# Patient Record
Sex: FEMALE | Race: OTHER | NOT HISPANIC OR LATINO
[De-identification: names, ages, dates, MRNs, and addresses within clinical notes are randomized per-mention and may not be internally consistent; named-entity substitution may affect disease eponyms.]

---

## 2021-06-24 ENCOUNTER — NON-APPOINTMENT (OUTPATIENT)
Age: 46
End: 2021-06-24

## 2021-06-24 PROBLEM — Z00.00 ENCOUNTER FOR PREVENTIVE HEALTH EXAMINATION: Status: ACTIVE | Noted: 2021-06-24

## 2021-06-25 ENCOUNTER — APPOINTMENT (OUTPATIENT)
Dept: COLORECTAL SURGERY | Facility: CLINIC | Age: 46
End: 2021-06-25
Payer: MEDICAID

## 2021-06-25 ENCOUNTER — TRANSCRIPTION ENCOUNTER (OUTPATIENT)
Age: 46
End: 2021-06-25

## 2021-06-25 ENCOUNTER — NON-APPOINTMENT (OUTPATIENT)
Age: 46
End: 2021-06-25

## 2021-06-25 ENCOUNTER — OUTPATIENT (OUTPATIENT)
Dept: OUTPATIENT SERVICES | Facility: HOSPITAL | Age: 46
LOS: 1 days | End: 2021-06-25

## 2021-06-25 VITALS
HEIGHT: 63 IN | DIASTOLIC BLOOD PRESSURE: 85 MMHG | WEIGHT: 113 LBS | BODY MASS INDEX: 20.02 KG/M2 | HEART RATE: 88 BPM | SYSTOLIC BLOOD PRESSURE: 124 MMHG | TEMPERATURE: 98.2 F

## 2021-06-25 DIAGNOSIS — K63.89 OTHER SPECIFIED DISEASES OF INTESTINE: ICD-10-CM

## 2021-06-25 DIAGNOSIS — Z83.2 FAMILY HISTORY OF DISEASES OF THE BLOOD AND BLOOD-FORMING ORGANS AND CERTAIN DISORDERS INVOLVING THE IMMUNE MECHANISM: ICD-10-CM

## 2021-06-25 DIAGNOSIS — Z01.818 ENCOUNTER FOR OTHER PREPROCEDURAL EXAMINATION: ICD-10-CM

## 2021-06-25 DIAGNOSIS — Z82.49 FAMILY HISTORY OF ISCHEMIC HEART DISEASE AND OTHER DISEASES OF THE CIRCULATORY SYSTEM: ICD-10-CM

## 2021-06-25 DIAGNOSIS — Z78.9 OTHER SPECIFIED HEALTH STATUS: ICD-10-CM

## 2021-06-25 PROCEDURE — 99205 OFFICE O/P NEW HI 60 MIN: CPT

## 2021-06-25 NOTE — ASSESSMENT
[FreeTextEntry1] : I had extensive discussion with the patient (60  minutes) regarding the diagnosis and treatment options. I recommended that she consider proceeding with a robotic sigmoid colectomy.\par \par The associated risks, benefits, alternatives of the procedure have been outlined discussed and reviewed with the patient's family. These risks including but not limited to bleeding, infection, anastomotic leak, need for secondary surgery, need for ileostomy or colostomy creation, change in bowel habits, DVT, PE as well as the risk of heart and lung complications infection and death were detailed. The patient understands these risks and consents the planned procedure. Appropriate  literature regarding surgery and post operative treatment/complications and enhanced recovery pathway has been detailed and reviewed. Consent was obtained. All questions were answered.\par  [Patient Optimized for Surgery] : Patient optimized for surgery [No Further Testing Recommended] : no further testing recommended

## 2021-06-25 NOTE — HISTORY OF PRESENT ILLNESS
[No Pertinent Cardiac History] : no history of aortic stenosis, atrial fibrillation, coronary artery disease, recent myocardial infarction, or implantable device/pacemaker [No Pertinent Pulmonary History] : no history of asthma, COPD, sleep apnea, or smoking [No Adverse Anesthesia Reaction] : no adverse anesthesia reaction in self or family member [(Patient denies any chest pain, claudication, dyspnea on exertion, orthopnea, palpitations or syncope)] : Patient denies any chest pain, claudication, dyspnea on exertion, orthopnea, palpitations or syncope [Good (7-10 METs)] : Good (7-10 METs) [FreeTextEntry2] : 7/30/21 [FreeTextEntry3] : Yeyo Lundberg [FreeTextEntry1] : 47 y/o F presents for evaluation of newly diagnosed colon cancer, referred by Dr. Booker\par \par Reports "sensitive stomach" and BRBPR with the BM for several years. Approximately one year BRBPR became more frequent occurring with a majority of BM's streaked on top and in the bowl. PCP evaluated patient and recommended dietary changes and prep H ointment which provided some improvement in symptoms. When symptoms did not resolve, referred for colonoscopy \par \par Colonoscopy completed 6/15/2021 for rectal bleeding\par - frond-like/villous, fungating, infiltrative and ulcerated large mass found in the sigmoid colon. Mass was partially circumferential, measuring 5 cm in length. Biopsied and polyp resection performed but incomplete. Area tattooed\par - non-bleeding internal hemorrhoids\par \par Pathology\par A.) colon, sigmoid, mass, polyp: moderately invasive adenocarcinoma\par \par CT A/P completed 6/23/21 at \par - circumferential wall thickening of the mid sigmoid colon noted, which may represent neoplasm vs other etiology\par - multiple fibroids identified in the uterus \par \par Reports occasional stomach cramping, has been improved with cutting out dairy\par Started on Low-FODMAP diet\par Denies rectal pain but reports occasional rectal discomfort\par Denies N/V, change in appetite or weight\par BH:TID\par Denies constipation, diarrhea or straining\par No use of stool softeners, fiber supplements or laxatives\par Reports adequate dietary fiber intake. Currently drinking 8 cups of water daily \par FMH of possible GI issues (possible IBS) in mother. Denies FMH of colorectal cancer

## 2021-06-25 NOTE — HISTORY OF PRESENT ILLNESS
[No Pertinent Cardiac History] : no history of aortic stenosis, atrial fibrillation, coronary artery disease, recent myocardial infarction, or implantable device/pacemaker [No Pertinent Pulmonary History] : no history of asthma, COPD, sleep apnea, or smoking [No Adverse Anesthesia Reaction] : no adverse anesthesia reaction in self or family member [(Patient denies any chest pain, claudication, dyspnea on exertion, orthopnea, palpitations or syncope)] : Patient denies any chest pain, claudication, dyspnea on exertion, orthopnea, palpitations or syncope [Good (7-10 METs)] : Good (7-10 METs) [FreeTextEntry2] : 7/30/21 [FreeTextEntry3] : Yeyo Lundberg [FreeTextEntry1] : 45 y/o F presents for evaluation of newly diagnosed colon cancer, referred by Dr. Booker\par \par Reports "sensitive stomach" and BRBPR with the BM for several years. Approximately one year BRBPR became more frequent occurring with a majority of BM's streaked on top and in the bowl. PCP evaluated patient and recommended dietary changes and prep H ointment which provided some improvement in symptoms. When symptoms did not resolve, referred for colonoscopy \par \par Colonoscopy completed 6/15/2021 for rectal bleeding\par - frond-like/villous, fungating, infiltrative and ulcerated large mass found in the sigmoid colon. Mass was partially circumferential, measuring 5 cm in length. Biopsied and polyp resection performed but incomplete. Area tattooed\par - non-bleeding internal hemorrhoids\par \par Pathology\par A.) colon, sigmoid, mass, polyp: moderately invasive adenocarcinoma\par \par CT A/P completed 6/23/21 at \par - circumferential wall thickening of the mid sigmoid colon noted, which may represent neoplasm vs other etiology\par - multiple fibroids identified in the uterus \par \par Reports occasional stomach cramping, has been improved with cutting out dairy\par Started on Low-FODMAP diet\par Denies rectal pain but reports occasional rectal discomfort\par Denies N/V, change in appetite or weight\par BH:TID\par Denies constipation, diarrhea or straining\par No use of stool softeners, fiber supplements or laxatives\par Reports adequate dietary fiber intake. Currently drinking 8 cups of water daily \par FMH of possible GI issues (possible IBS) in mother. Denies FMH of colorectal cancer

## 2021-06-25 NOTE — PHYSICAL EXAM
[Abdomen Masses] : No abdominal masses [Abdomen Tenderness] : ~T No ~M abdominal tenderness [No HSM] : no hepatosplenomegaly [JVD] : no jugular venous distention  [Normal Breath Sounds] : Normal breath sounds [Normal Heart Sounds] : normal heart sounds [No Rash or Lesion] : No rash or lesion [Alert] : alert [Calm] : calm [No Acute Distress] : no acute distress [Well Nourished] : well nourished [Well Developed] : well developed [Well-Appearing] : well-appearing [Normal Sclera/Conjunctiva] : normal sclera/conjunctiva [Normal Outer Ear/Nose] : the outer ears and nose were normal in appearance [No JVD] : no jugular venous distention [No Respiratory Distress] : no respiratory distress  [No Accessory Muscle Use] : no accessory muscle use [Clear to Auscultation] : lungs were clear to auscultation bilaterally [Normal Rate] : normal rate  [Regular Rhythm] : with a regular rhythm [Normal S1, S2] : normal S1 and S2 [No Murmur] : no murmur heard [Soft] : abdomen soft [Non Tender] : non-tender [Non-distended] : non-distended [Normal Posterior Cervical Nodes] : no posterior cervical lymphadenopathy [Normal Anterior Cervical Nodes] : no anterior cervical lymphadenopathy [No Joint Swelling] : no joint swelling [Grossly Normal Strength/Tone] : grossly normal strength/tone [No Rash] : no rash [Coordination Grossly Intact] : coordination grossly intact [No Focal Deficits] : no focal deficits [Normal Gait] : normal gait [Normal Affect] : the affect was normal [Normal Insight/Judgement] : insight and judgment were intact

## 2021-06-28 ENCOUNTER — RESULT REVIEW (OUTPATIENT)
Age: 46
End: 2021-06-28

## 2021-06-28 ENCOUNTER — OUTPATIENT (OUTPATIENT)
Dept: OUTPATIENT SERVICES | Facility: HOSPITAL | Age: 46
LOS: 1 days | End: 2021-06-28
Payer: COMMERCIAL

## 2021-06-28 ENCOUNTER — APPOINTMENT (OUTPATIENT)
Dept: CT IMAGING | Facility: CLINIC | Age: 46
End: 2021-06-28
Payer: MEDICAID

## 2021-06-28 ENCOUNTER — OUTPATIENT (OUTPATIENT)
Dept: OUTPATIENT SERVICES | Facility: HOSPITAL | Age: 46
LOS: 1 days | End: 2021-06-28

## 2021-06-28 DIAGNOSIS — K63.89 OTHER SPECIFIED DISEASES OF INTESTINE: ICD-10-CM

## 2021-06-28 LAB
ALBUMIN SERPL ELPH-MCNC: 4.2 G/DL
ALP BLD-CCNC: 57 U/L
ALT SERPL-CCNC: 10 U/L
ANION GAP SERPL CALC-SCNC: 13 MMOL/L
APTT BLD: 28.2 SEC
AST SERPL-CCNC: 15 U/L
BASOPHILS # BLD AUTO: 0.08 K/UL
BASOPHILS NFR BLD AUTO: 1.6 %
BILIRUB SERPL-MCNC: 0.6 MG/DL
BUN SERPL-MCNC: 8 MG/DL
CALCIUM SERPL-MCNC: 9.8 MG/DL
CEA SERPL-MCNC: 1.2 NG/ML
CHLORIDE SERPL-SCNC: 104 MMOL/L
CO2 SERPL-SCNC: 21 MMOL/L
CREAT SERPL-MCNC: 0.57 MG/DL
EOSINOPHIL # BLD AUTO: 0.07 K/UL
EOSINOPHIL NFR BLD AUTO: 1.4 %
GLUCOSE SERPL-MCNC: 99 MG/DL
HCT VFR BLD CALC: 40.3 %
HGB BLD-MCNC: 12.5 G/DL
IMM GRANULOCYTES NFR BLD AUTO: 0.2 %
INR PPP: 1.01 RATIO
LYMPHOCYTES # BLD AUTO: 1.69 K/UL
LYMPHOCYTES NFR BLD AUTO: 33.5 %
MAN DIFF?: NORMAL
MCHC RBC-ENTMCNC: 26.5 PG
MCHC RBC-ENTMCNC: 31 GM/DL
MCV RBC AUTO: 85.4 FL
MONOCYTES # BLD AUTO: 0.36 K/UL
MONOCYTES NFR BLD AUTO: 7.1 %
NEUTROPHILS # BLD AUTO: 2.83 K/UL
NEUTROPHILS NFR BLD AUTO: 56.2 %
PLATELET # BLD AUTO: 355 K/UL
POTASSIUM SERPL-SCNC: 4.5 MMOL/L
PROT SERPL-MCNC: 7 G/DL
PT BLD: 11.9 SEC
RBC # BLD: 4.72 M/UL
RBC # FLD: 15.5 %
SODIUM SERPL-SCNC: 138 MMOL/L
WBC # FLD AUTO: 5.04 K/UL

## 2021-06-28 PROCEDURE — 71260 CT THORAX DX C+: CPT | Mod: 26

## 2021-06-28 PROCEDURE — 88321 CONSLTJ&REPRT SLD PREP ELSWR: CPT

## 2021-06-29 ENCOUNTER — TRANSCRIPTION ENCOUNTER (OUTPATIENT)
Age: 46
End: 2021-06-29

## 2021-06-29 VITALS
WEIGHT: 109.57 LBS | OXYGEN SATURATION: 95 % | HEIGHT: 60 IN | HEART RATE: 92 BPM | DIASTOLIC BLOOD PRESSURE: 78 MMHG | RESPIRATION RATE: 18 BRPM | SYSTOLIC BLOOD PRESSURE: 110 MMHG | TEMPERATURE: 98 F

## 2021-06-29 LAB — SURGICAL PATHOLOGY STUDY: SIGNIFICANT CHANGE UP

## 2021-06-30 ENCOUNTER — NON-APPOINTMENT (OUTPATIENT)
Age: 46
End: 2021-06-30

## 2021-06-30 ENCOUNTER — APPOINTMENT (OUTPATIENT)
Dept: COLORECTAL SURGERY | Facility: HOSPITAL | Age: 46
End: 2021-06-30

## 2021-06-30 ENCOUNTER — INPATIENT (INPATIENT)
Facility: HOSPITAL | Age: 46
LOS: 1 days | Discharge: HOME | DRG: 331 | End: 2021-07-02
Attending: SURGERY | Admitting: SURGERY
Payer: COMMERCIAL

## 2021-06-30 ENCOUNTER — RESULT REVIEW (OUTPATIENT)
Age: 46
End: 2021-06-30

## 2021-06-30 DIAGNOSIS — Z98.890 OTHER SPECIFIED POSTPROCEDURAL STATES: Chronic | ICD-10-CM

## 2021-06-30 LAB
BLD GP AB SCN SERPL QL: NEGATIVE — SIGNIFICANT CHANGE UP
RH IG SCN BLD-IMP: POSITIVE — SIGNIFICANT CHANGE UP

## 2021-06-30 PROCEDURE — 88309 TISSUE EXAM BY PATHOLOGIST: CPT | Mod: 26

## 2021-06-30 PROCEDURE — 44213 LAP MOBIL SPLENIC FL ADD-ON: CPT | Mod: GC

## 2021-06-30 PROCEDURE — 88304 TISSUE EXAM BY PATHOLOGIST: CPT | Mod: 26

## 2021-06-30 PROCEDURE — 45330 DIAGNOSTIC SIGMOIDOSCOPY: CPT

## 2021-06-30 PROCEDURE — 44213 LAP MOBIL SPLENIC FL ADD-ON: CPT | Mod: 82

## 2021-06-30 PROCEDURE — 44207 L COLECTOMY/COLOPROCTOSTOMY: CPT | Mod: 82

## 2021-06-30 PROCEDURE — 44207 L COLECTOMY/COLOPROCTOSTOMY: CPT | Mod: GC

## 2021-06-30 RX ORDER — KETOROLAC TROMETHAMINE 30 MG/ML
15 SYRINGE (ML) INJECTION EVERY 6 HOURS
Refills: 0 | Status: DISCONTINUED | OUTPATIENT
Start: 2021-06-30 | End: 2021-07-02

## 2021-06-30 RX ORDER — ONDANSETRON 8 MG/1
4 TABLET, FILM COATED ORAL EVERY 6 HOURS
Refills: 0 | Status: DISCONTINUED | OUTPATIENT
Start: 2021-06-30 | End: 2021-07-02

## 2021-06-30 RX ORDER — DIPHENHYDRAMINE HCL 50 MG
0 CAPSULE ORAL
Qty: 0 | Refills: 0 | DISCHARGE

## 2021-06-30 RX ORDER — HYDROMORPHONE HYDROCHLORIDE 2 MG/ML
0.5 INJECTION INTRAMUSCULAR; INTRAVENOUS; SUBCUTANEOUS EVERY 4 HOURS
Refills: 0 | Status: DISCONTINUED | OUTPATIENT
Start: 2021-06-30 | End: 2021-07-01

## 2021-06-30 RX ORDER — HEPARIN SODIUM 5000 [USP'U]/ML
5000 INJECTION INTRAVENOUS; SUBCUTANEOUS EVERY 8 HOURS
Refills: 0 | Status: DISCONTINUED | OUTPATIENT
Start: 2021-06-30 | End: 2021-07-02

## 2021-06-30 RX ORDER — PREGABALIN 225 MG/1
1 CAPSULE ORAL
Qty: 0 | Refills: 0 | DISCHARGE

## 2021-06-30 RX ORDER — L.ACIDOPH/B.ANIMALIS/B.LONGUM 15B CELL
1 CAPSULE ORAL
Qty: 0 | Refills: 0 | DISCHARGE

## 2021-06-30 RX ORDER — BUPIVACAINE 13.3 MG/ML
20 INJECTION, SUSPENSION, LIPOSOMAL INFILTRATION ONCE
Refills: 0 | Status: DISCONTINUED | OUTPATIENT
Start: 2021-06-30 | End: 2021-06-30

## 2021-06-30 RX ORDER — CHOLECALCIFEROL (VITAMIN D3) 125 MCG
0 CAPSULE ORAL
Qty: 0 | Refills: 0 | DISCHARGE

## 2021-06-30 RX ORDER — GABAPENTIN 400 MG/1
600 CAPSULE ORAL ONCE
Refills: 0 | Status: COMPLETED | OUTPATIENT
Start: 2021-06-30 | End: 2021-06-30

## 2021-06-30 RX ORDER — ACETAMINOPHEN 500 MG
1000 TABLET ORAL ONCE
Refills: 0 | Status: COMPLETED | OUTPATIENT
Start: 2021-06-30 | End: 2021-06-30

## 2021-06-30 RX ORDER — ACETAMINOPHEN 500 MG
1000 TABLET ORAL EVERY 6 HOURS
Refills: 0 | Status: DISCONTINUED | OUTPATIENT
Start: 2021-06-30 | End: 2021-07-02

## 2021-06-30 RX ORDER — SODIUM CHLORIDE 9 MG/ML
1000 INJECTION, SOLUTION INTRAVENOUS
Refills: 0 | Status: DISCONTINUED | OUTPATIENT
Start: 2021-06-30 | End: 2021-07-01

## 2021-06-30 RX ORDER — HEPARIN SODIUM 5000 [USP'U]/ML
5000 INJECTION INTRAVENOUS; SUBCUTANEOUS ONCE
Refills: 0 | Status: COMPLETED | OUTPATIENT
Start: 2021-06-30 | End: 2021-06-30

## 2021-06-30 RX ADMIN — Medication 1000 MILLIGRAM(S): at 07:54

## 2021-06-30 RX ADMIN — Medication 15 MILLIGRAM(S): at 23:37

## 2021-06-30 RX ADMIN — Medication 1000 MILLIGRAM(S): at 21:34

## 2021-06-30 RX ADMIN — Medication 15 MILLIGRAM(S): at 18:07

## 2021-06-30 RX ADMIN — GABAPENTIN 600 MILLIGRAM(S): 400 CAPSULE ORAL at 07:53

## 2021-06-30 RX ADMIN — SODIUM CHLORIDE 40 MILLILITER(S): 9 INJECTION, SOLUTION INTRAVENOUS at 23:36

## 2021-06-30 RX ADMIN — HEPARIN SODIUM 5000 UNIT(S): 5000 INJECTION INTRAVENOUS; SUBCUTANEOUS at 07:53

## 2021-06-30 RX ADMIN — SODIUM CHLORIDE 40 MILLILITER(S): 9 INJECTION, SOLUTION INTRAVENOUS at 13:07

## 2021-06-30 RX ADMIN — Medication 15 MILLIGRAM(S): at 18:22

## 2021-06-30 RX ADMIN — HEPARIN SODIUM 5000 UNIT(S): 5000 INJECTION INTRAVENOUS; SUBCUTANEOUS at 18:07

## 2021-06-30 RX ADMIN — Medication 1000 MILLIGRAM(S): at 22:34

## 2021-06-30 RX ADMIN — Medication 15 MILLIGRAM(S): at 12:59

## 2021-06-30 NOTE — BRIEF OPERATIVE NOTE - OPERATION/FINDINGS
robot assisted sigmoidectomy, sigmoid colon dissected using monopolar scissor and vessel sealer, STEPH taken with vessel sealer. sigmoid colon taken at rectosigmoid junction using robotic stapler 60 blue load, hemostasis achieved, bowel extracoporalized and sigmoid colon taken at proximal side using automatic purse string applier, end to end anastamosis with EEA 28. leak test negative. closing tray as per colon bundle

## 2021-06-30 NOTE — H&P ADULT - NSHPPHYSICALEXAM_GEN_ALL_CORE
Constitutional: WDWN NAD  Heart: S1 S2  Lungs: no use of accessory muscles  Abdomen: soft, nontender, nondistended, without masses, erythema, ecchymosis

## 2021-06-30 NOTE — H&P ADULT - ASSESSMENT
46 year old female with PMH of newly diagnosed colon cancer presents for sigmoid colectomy.    Plan:  to OR   admission post op  Colon bundle  ERAS protocol

## 2021-06-30 NOTE — PROGRESS NOTE ADULT - SUBJECTIVE AND OBJECTIVE BOX
POST OP CHECK    Procedure: Robot assisted sigmoidectomy   Surgeon: Dr. Lundberg     S: Pt seen and examined at bedside. Reports no acute complaints. Denies F, N, V, CP, SOB, SHAH, calf tenderness. Pain controlled with medication. Pt is HD stable.     O:  T(C): 36.7 (06-30-21 @ 13:56), Max: 36.8 (06-30-21 @ 12:58)  T(F): 98.1 (06-30-21 @ 13:56), Max: 98.3 (06-30-21 @ 12:58)  HR: 78 (06-30-21 @ 13:56) (64 - 82)  BP: 109/69 (06-30-21 @ 13:56) (105/62 - 116/69)  RR: 18 (06-30-21 @ 13:56) (10 - 20)  SpO2: 100% (06-30-21 @ 13:56) (99% - 100%)  Wt(kg): --    Gen: NAD, resting comfortably in bed, A&O x3  C/V: NSR  Pulm: Nonlabored breathing, no respiratory distress  Abd: abdomen soft, nd, appropriately ttp to incisions. Dressings c/d/i.  Extrem: WWP, no calf tenderness or edema, SCDs in place    A/P: 46 year old female with PMH of newly diagnosed colon cancer presents for sigmoid colectomy. Patient initially presented with "sensitive stomach" and BRBPR for several years, becoming more frequent in the last year. Underwent colonoscopy on 6/15/21 which showed a frond-like/villous, fungating, infiltrative and ulcerated large mass in the sigmoid colon, partially circumferential measuring 5cm in length. Pathology consistent with moderately invasive adenocarcinoma. Pt is now s/p robot assisted sigmoidectomy on 6/30    Pain/nausea control prn  CLD/IVF  HSQ/SCDs   OOBA/IS  Fritz  AM labs

## 2021-06-30 NOTE — BRIEF OPERATIVE NOTE - NSICDXBRIEFPROCEDURE_GEN_ALL_CORE_FT
PROCEDURES:  Colectomy, sigmoid, robot-assisted, laparoscopic 30-Jun-2021 11:37:51  Mabel Rodriguez

## 2021-06-30 NOTE — H&P ADULT - HISTORY OF PRESENT ILLNESS
46 year old female with PMH of newly diagnosed colon cancer presents for sigmoid colectomy. Patient initially presented with "sensitive stomach" and BRBPR for several years, becoming more frequent in the last year. Underwent colonoscopy on 6/15/21 which showed a frond-like/villous, fungating, infiltrative and ulcerated large mass in the sigmoid colon, partially circumferential measuring 5cm in length. Pathology consistent with moderately invasive adenocarcinoma. Patient does not have complaints at this time.

## 2021-07-01 LAB
ANION GAP SERPL CALC-SCNC: 10 MMOL/L — SIGNIFICANT CHANGE UP (ref 5–17)
BUN SERPL-MCNC: 4 MG/DL — LOW (ref 7–23)
CALCIUM SERPL-MCNC: 8 MG/DL — LOW (ref 8.4–10.5)
CHLORIDE SERPL-SCNC: 104 MMOL/L — SIGNIFICANT CHANGE UP (ref 96–108)
CO2 SERPL-SCNC: 20 MMOL/L — LOW (ref 22–31)
COVID-19 SPIKE DOMAIN AB INTERP: POSITIVE
COVID-19 SPIKE DOMAIN ANTIBODY RESULT: >250 U/ML — HIGH
CREAT SERPL-MCNC: 0.55 MG/DL — SIGNIFICANT CHANGE UP (ref 0.5–1.3)
GLUCOSE SERPL-MCNC: 88 MG/DL — SIGNIFICANT CHANGE UP (ref 70–99)
HCT VFR BLD CALC: 34.3 % — LOW (ref 34.5–45)
HGB BLD-MCNC: 10.9 G/DL — LOW (ref 11.5–15.5)
MAGNESIUM SERPL-MCNC: 1.6 MG/DL — SIGNIFICANT CHANGE UP (ref 1.6–2.6)
MCHC RBC-ENTMCNC: 26.6 PG — LOW (ref 27–34)
MCHC RBC-ENTMCNC: 31.8 GM/DL — LOW (ref 32–36)
MCV RBC AUTO: 83.7 FL — SIGNIFICANT CHANGE UP (ref 80–100)
NRBC # BLD: 0 /100 WBCS — SIGNIFICANT CHANGE UP (ref 0–0)
PHOSPHATE SERPL-MCNC: 3.1 MG/DL — SIGNIFICANT CHANGE UP (ref 2.5–4.5)
PLATELET # BLD AUTO: 297 K/UL — SIGNIFICANT CHANGE UP (ref 150–400)
POTASSIUM SERPL-MCNC: 3.9 MMOL/L — SIGNIFICANT CHANGE UP (ref 3.5–5.3)
POTASSIUM SERPL-SCNC: 3.9 MMOL/L — SIGNIFICANT CHANGE UP (ref 3.5–5.3)
RBC # BLD: 4.1 M/UL — SIGNIFICANT CHANGE UP (ref 3.8–5.2)
RBC # FLD: 15.4 % — HIGH (ref 10.3–14.5)
SARS-COV-2 IGG+IGM SERPL QL IA: >250 U/ML — HIGH
SARS-COV-2 IGG+IGM SERPL QL IA: POSITIVE
SODIUM SERPL-SCNC: 134 MMOL/L — LOW (ref 135–145)
WBC # BLD: 7.22 K/UL — SIGNIFICANT CHANGE UP (ref 3.8–10.5)
WBC # FLD AUTO: 7.22 K/UL — SIGNIFICANT CHANGE UP (ref 3.8–10.5)

## 2021-07-01 RX ORDER — MAGNESIUM SULFATE 500 MG/ML
2 VIAL (ML) INJECTION ONCE
Refills: 0 | Status: COMPLETED | OUTPATIENT
Start: 2021-07-01 | End: 2021-07-01

## 2021-07-01 RX ORDER — POTASSIUM CHLORIDE 20 MEQ
20 PACKET (EA) ORAL ONCE
Refills: 0 | Status: COMPLETED | OUTPATIENT
Start: 2021-07-01 | End: 2021-07-01

## 2021-07-01 RX ADMIN — Medication 1000 MILLIGRAM(S): at 13:10

## 2021-07-01 RX ADMIN — HEPARIN SODIUM 5000 UNIT(S): 5000 INJECTION INTRAVENOUS; SUBCUTANEOUS at 12:04

## 2021-07-01 RX ADMIN — Medication 20 MILLIEQUIVALENT(S): at 12:04

## 2021-07-01 RX ADMIN — HEPARIN SODIUM 5000 UNIT(S): 5000 INJECTION INTRAVENOUS; SUBCUTANEOUS at 21:37

## 2021-07-01 RX ADMIN — Medication 15 MILLIGRAM(S): at 06:00

## 2021-07-01 RX ADMIN — HEPARIN SODIUM 5000 UNIT(S): 5000 INJECTION INTRAVENOUS; SUBCUTANEOUS at 02:04

## 2021-07-01 RX ADMIN — Medication 1000 MILLIGRAM(S): at 03:31

## 2021-07-01 RX ADMIN — Medication 1000 MILLIGRAM(S): at 03:00

## 2021-07-01 RX ADMIN — Medication 15 MILLIGRAM(S): at 12:04

## 2021-07-01 RX ADMIN — Medication 50 GRAM(S): at 16:08

## 2021-07-01 RX ADMIN — Medication 1000 MILLIGRAM(S): at 22:37

## 2021-07-01 RX ADMIN — Medication 15 MILLIGRAM(S): at 00:35

## 2021-07-01 RX ADMIN — Medication 1000 MILLIGRAM(S): at 21:37

## 2021-07-01 RX ADMIN — Medication 1000 MILLIGRAM(S): at 15:00

## 2021-07-01 RX ADMIN — Medication 15 MILLIGRAM(S): at 01:22

## 2021-07-01 RX ADMIN — Medication 15 MILLIGRAM(S): at 18:09

## 2021-07-01 RX ADMIN — Medication 15 MILLIGRAM(S): at 07:00

## 2021-07-01 RX ADMIN — Medication 15 MILLIGRAM(S): at 18:29

## 2021-07-01 NOTE — PROGRESS NOTE ADULT - SUBJECTIVE AND OBJECTIVE BOX
INTERVAL HPI/OVERNIGHT EVENTS: OOBA, -F/-BM, hortencia CLD, VSS. HLIV     STATUS POST:    6/30: robot assisted sigmoidectomy    POST OPERATIVE DAY #: 2    SUBJECTIVE: Pt seen and examined at bedside this am by surgery team. Tolerating diet, pain well controlled. Denies f/n/v/cp/sob.    MEDICATIONS  (STANDING):  acetaminophen   Tablet .. 1000 milliGRAM(s) Oral every 6 hours  heparin   Injectable 5000 Unit(s) SubCutaneous every 8 hours  ketorolac   Injectable 15 milliGRAM(s) IV Push every 6 hours    MEDICATIONS  (PRN):  HYDROmorphone  Injectable 0.5 milliGRAM(s) IV Push every 4 hours PRN Severe Pain (7 - 10)  ondansetron Injectable 4 milliGRAM(s) IV Push every 6 hours PRN Nausea and/or Vomiting      Vital Signs Last 24 Hrs  T(C): 36.8 (01 Jul 2021 08:39), Max: 37.2 (30 Jun 2021 20:25)  T(F): 98.2 (01 Jul 2021 08:39), Max: 99 (30 Jun 2021 20:25)  HR: 80 (01 Jul 2021 08:39) (64 - 92)  BP: 114/80 (01 Jul 2021 08:39) (105/62 - 123/72)  BP(mean): 87 (01 Jul 2021 05:28) (78 - 90)  RR: 16 (01 Jul 2021 08:39) (10 - 20)  SpO2: 97% (01 Jul 2021 08:39) (97% - 100%)    PHYSICAL EXAM:    Gen: NAD, resting comfortably in bed, A&O x3  C/V: NSR  Pulm: Nonlabored breathing, no respiratory distress  Abd: abdomen soft, nd, appropriately ttp to incisions. Dressings c/d/i.  Extrem: WWP, no calf tenderness or edema, SCDs in place      I&O's Detail    30 Jun 2021 07:01  -  01 Jul 2021 07:00  --------------------------------------------------------  IN:    Lactated Ringers: 800 mL    Oral Fluid: 897 mL  Total IN: 1697 mL    OUT:    Indwelling Catheter - Urethral (mL): 1150 mL  Total OUT: 1150 mL    Total NET: 547 mL      01 Jul 2021 07:01  -  01 Jul 2021 11:27  --------------------------------------------------------  IN:    Lactated Ringers: 40 mL    Oral Fluid: 320 mL  Total IN: 360 mL    OUT:    Voided (mL): 550 mL  Total OUT: 550 mL    Total NET: -190 mL          LABS:                        10.9   7.22  )-----------( 297      ( 01 Jul 2021 10:14 )             34.3     07-01    134<L>  |  104  |  4<L>  ----------------------------<  88  3.9   |  20<L>  |  0.55    Ca    8.0<L>      01 Jul 2021 10:14  Phos  3.1     07-01  Mg     1.6     07-01            RADIOLOGY & ADDITIONAL STUDIES: INTERVAL HPI/OVERNIGHT EVENTS: OOBA, -F/-BM, hortencia CLD, VSS. HLIV     STATUS POST:    6/30: robot assisted sigmoidectomy    POST OPERATIVE DAY #: 1    SUBJECTIVE: Pt seen and examined at bedside this am by surgery team. Tolerating diet, pain well controlled. Denies f/n/v/cp/sob. Fritz removed this morning and pt is voiding.    MEDICATIONS  (STANDING):  acetaminophen   Tablet .. 1000 milliGRAM(s) Oral every 6 hours  heparin   Injectable 5000 Unit(s) SubCutaneous every 8 hours  ketorolac   Injectable 15 milliGRAM(s) IV Push every 6 hours    MEDICATIONS  (PRN):  HYDROmorphone  Injectable 0.5 milliGRAM(s) IV Push every 4 hours PRN Severe Pain (7 - 10)  ondansetron Injectable 4 milliGRAM(s) IV Push every 6 hours PRN Nausea and/or Vomiting      Vital Signs Last 24 Hrs  T(C): 36.8 (01 Jul 2021 08:39), Max: 37.2 (30 Jun 2021 20:25)  T(F): 98.2 (01 Jul 2021 08:39), Max: 99 (30 Jun 2021 20:25)  HR: 80 (01 Jul 2021 08:39) (64 - 92)  BP: 114/80 (01 Jul 2021 08:39) (105/62 - 123/72)  BP(mean): 87 (01 Jul 2021 05:28) (78 - 90)  RR: 16 (01 Jul 2021 08:39) (10 - 20)  SpO2: 97% (01 Jul 2021 08:39) (97% - 100%)    PHYSICAL EXAM:    Gen: NAD, resting comfortably in bed, A&O x3  C/V: NSR  Pulm: Nonlabored breathing, no respiratory distress  Abd: abdomen soft, nd, appropriately ttp to incisions. Dressings c/d/i.  Extrem: WWP, no calf tenderness or edema, SCDs in place      I&O's Detail    30 Jun 2021 07:01  -  01 Jul 2021 07:00  --------------------------------------------------------  IN:    Lactated Ringers: 800 mL    Oral Fluid: 897 mL  Total IN: 1697 mL    OUT:    Indwelling Catheter - Urethral (mL): 1150 mL  Total OUT: 1150 mL    Total NET: 547 mL      01 Jul 2021 07:01  -  01 Jul 2021 11:27  --------------------------------------------------------  IN:    Lactated Ringers: 40 mL    Oral Fluid: 320 mL  Total IN: 360 mL    OUT:    Voided (mL): 550 mL  Total OUT: 550 mL    Total NET: -190 mL        LABS:                        10.9   7.22  )-----------( 297      ( 01 Jul 2021 10:14 )             34.3     07-01    134<L>  |  104  |  4<L>  ----------------------------<  88  3.9   |  20<L>  |  0.55    Ca    8.0<L>      01 Jul 2021 10:14  Phos  3.1     07-01  Mg     1.6     07-01

## 2021-07-01 NOTE — PROGRESS NOTE ADULT - ASSESSMENT
46 year old female with PMH of newly diagnosed colon cancer presents for sigmoid colectomy. Patient initially presented with "sensitive stomach" and BRBPR for several years, becoming more frequent in the last year. Underwent colonoscopy on 6/15/21 which showed a frond-like/villous, fungating, infiltrative and ulcerated large mass in the sigmoid colon, partially circumferential measuring 5cm in length. Pathology consistent with moderately invasive adenocarcinoma. Pt is now s/p robot assisted sigmoidectomy on 6/30    Pain/nausea control prn  CLD/IVF  HSQ/SCDs   OOBA/IS  Fritz  AM labs    46 year old female with PMH of newly diagnosed colon cancer presents for sigmoid colectomy. Patient initially presented with "sensitive stomach" and BRBPR for several years, becoming more frequent in the last year. Underwent colonoscopy on 6/15/21 which showed a frond-like/villous, fungating, infiltrative and ulcerated large mass in the sigmoid colon, partially circumferential measuring 5cm in length. Pathology consistent with moderately invasive adenocarcinoma. Pt is now s/p robot assisted sigmoidectomy on 6/30. Will advance diet to low residue tonight    Pain/nausea control prn  CLD/IVF  HSQ/SCDs   OOBA/IS  AM labs

## 2021-07-02 ENCOUNTER — TRANSCRIPTION ENCOUNTER (OUTPATIENT)
Age: 46
End: 2021-07-02

## 2021-07-02 VITALS
RESPIRATION RATE: 16 BRPM | DIASTOLIC BLOOD PRESSURE: 82 MMHG | HEART RATE: 73 BPM | SYSTOLIC BLOOD PRESSURE: 117 MMHG | OXYGEN SATURATION: 96 % | TEMPERATURE: 98 F

## 2021-07-02 LAB
ANION GAP SERPL CALC-SCNC: 10 MMOL/L — SIGNIFICANT CHANGE UP (ref 5–17)
BUN SERPL-MCNC: 4 MG/DL — LOW (ref 7–23)
CALCIUM SERPL-MCNC: 8.1 MG/DL — LOW (ref 8.4–10.5)
CHLORIDE SERPL-SCNC: 108 MMOL/L — SIGNIFICANT CHANGE UP (ref 96–108)
CO2 SERPL-SCNC: 20 MMOL/L — LOW (ref 22–31)
CREAT SERPL-MCNC: 0.51 MG/DL — SIGNIFICANT CHANGE UP (ref 0.5–1.3)
GLUCOSE SERPL-MCNC: 87 MG/DL — SIGNIFICANT CHANGE UP (ref 70–99)
HCT VFR BLD CALC: 33.6 % — LOW (ref 34.5–45)
HGB BLD-MCNC: 10.8 G/DL — LOW (ref 11.5–15.5)
MAGNESIUM SERPL-MCNC: 1.8 MG/DL — SIGNIFICANT CHANGE UP (ref 1.6–2.6)
MCHC RBC-ENTMCNC: 26.2 PG — LOW (ref 27–34)
MCHC RBC-ENTMCNC: 32.1 GM/DL — SIGNIFICANT CHANGE UP (ref 32–36)
MCV RBC AUTO: 81.6 FL — SIGNIFICANT CHANGE UP (ref 80–100)
NRBC # BLD: 0 /100 WBCS — SIGNIFICANT CHANGE UP (ref 0–0)
PHOSPHATE SERPL-MCNC: 3.1 MG/DL — SIGNIFICANT CHANGE UP (ref 2.5–4.5)
PLATELET # BLD AUTO: 281 K/UL — SIGNIFICANT CHANGE UP (ref 150–400)
POTASSIUM SERPL-MCNC: 4 MMOL/L — SIGNIFICANT CHANGE UP (ref 3.5–5.3)
POTASSIUM SERPL-SCNC: 4 MMOL/L — SIGNIFICANT CHANGE UP (ref 3.5–5.3)
RBC # BLD: 4.12 M/UL — SIGNIFICANT CHANGE UP (ref 3.8–5.2)
RBC # FLD: 15.4 % — HIGH (ref 10.3–14.5)
SODIUM SERPL-SCNC: 138 MMOL/L — SIGNIFICANT CHANGE UP (ref 135–145)
WBC # BLD: 4.7 K/UL — SIGNIFICANT CHANGE UP (ref 3.8–10.5)
WBC # FLD AUTO: 4.7 K/UL — SIGNIFICANT CHANGE UP (ref 3.8–10.5)

## 2021-07-02 PROCEDURE — 88304 TISSUE EXAM BY PATHOLOGIST: CPT

## 2021-07-02 PROCEDURE — 83735 ASSAY OF MAGNESIUM: CPT

## 2021-07-02 PROCEDURE — 86901 BLOOD TYPING SEROLOGIC RH(D): CPT

## 2021-07-02 PROCEDURE — 86900 BLOOD TYPING SEROLOGIC ABO: CPT

## 2021-07-02 PROCEDURE — S2900: CPT

## 2021-07-02 PROCEDURE — 36415 COLL VENOUS BLD VENIPUNCTURE: CPT

## 2021-07-02 PROCEDURE — 86850 RBC ANTIBODY SCREEN: CPT

## 2021-07-02 PROCEDURE — 80048 BASIC METABOLIC PNL TOTAL CA: CPT

## 2021-07-02 PROCEDURE — C1889: CPT

## 2021-07-02 PROCEDURE — 86769 SARS-COV-2 COVID-19 ANTIBODY: CPT

## 2021-07-02 PROCEDURE — 88309 TISSUE EXAM BY PATHOLOGIST: CPT

## 2021-07-02 PROCEDURE — 85027 COMPLETE CBC AUTOMATED: CPT

## 2021-07-02 PROCEDURE — 84100 ASSAY OF PHOSPHORUS: CPT

## 2021-07-02 PROCEDURE — 82962 GLUCOSE BLOOD TEST: CPT

## 2021-07-02 RX ORDER — DOCUSATE SODIUM 100 MG
1 CAPSULE ORAL
Qty: 14 | Refills: 0
Start: 2021-07-02 | End: 2021-07-08

## 2021-07-02 RX ORDER — MAGNESIUM SULFATE 500 MG/ML
1 VIAL (ML) INJECTION ONCE
Refills: 0 | Status: COMPLETED | OUTPATIENT
Start: 2021-07-02 | End: 2021-07-02

## 2021-07-02 RX ADMIN — Medication 15 MILLIGRAM(S): at 00:58

## 2021-07-02 RX ADMIN — Medication 1000 MILLIGRAM(S): at 06:58

## 2021-07-02 RX ADMIN — Medication 15 MILLIGRAM(S): at 13:20

## 2021-07-02 RX ADMIN — Medication 15 MILLIGRAM(S): at 06:02

## 2021-07-02 RX ADMIN — Medication 1000 MILLIGRAM(S): at 12:43

## 2021-07-02 RX ADMIN — Medication 1000 MILLIGRAM(S): at 06:02

## 2021-07-02 RX ADMIN — Medication 100 GRAM(S): at 10:43

## 2021-07-02 RX ADMIN — Medication 15 MILLIGRAM(S): at 06:58

## 2021-07-02 RX ADMIN — Medication 15 MILLIGRAM(S): at 01:58

## 2021-07-02 RX ADMIN — HEPARIN SODIUM 5000 UNIT(S): 5000 INJECTION INTRAVENOUS; SUBCUTANEOUS at 14:38

## 2021-07-02 RX ADMIN — Medication 15 MILLIGRAM(S): at 12:43

## 2021-07-02 RX ADMIN — HEPARIN SODIUM 5000 UNIT(S): 5000 INJECTION INTRAVENOUS; SUBCUTANEOUS at 06:02

## 2021-07-02 RX ADMIN — Medication 1000 MILLIGRAM(S): at 13:20

## 2021-07-02 NOTE — DISCHARGE NOTE NURSING/CASE MANAGEMENT/SOCIAL WORK - PATIENT PORTAL LINK FT
You can access the FollowMyHealth Patient Portal offered by [facility] by registering at the following website: [email or web address]. By joining [employer]’s FollowMyHealth portal, you will also be able to view your health information using other applications (apps) compatible with our system.

## 2021-07-02 NOTE — DISCHARGE NOTE PROVIDER - HOSPITAL COURSE
46 year old female with PMH of newly diagnosed colon cancer presents for elective surgery on 6/30, taken to the OR for robot assisted sigmoidectomy. Transferred to PACU in stable condition. No walt operative complications noted. Diet advanced as tolerated and per return of bowel function. At time of discharge pt is tolerating diet, pain well controlled, pt is ambulating/voiding freely, having adequate bowel function. Pt is HD stable and medically ready for discharge.

## 2021-07-02 NOTE — DISCHARGE NOTE PROVIDER - NSDCMRMEDTOKEN_GEN_ALL_CORE_FT
Colace 100 mg oral capsule: 1 cap(s) orally 2 times a day, As Needed -for constipation   diphenhydrAMINE 25 mg oral capsule: orally prn  oxycodone-acetaminophen 5 mg-325 mg oral tablet: 1 tab(s) orally every 6 hours, As Needed -for severe pain MDD:4 tablets   Probiotic Formula oral capsule: 1 cap(s) orally once a day  Vitamin B12 1000 mcg oral tablet: 1 tab(s) orally once a day  Vitamin D3 1000 intl units (25 mcg) oral tablet, chewable: orally once a day

## 2021-07-02 NOTE — DISCHARGE NOTE PROVIDER - CARE PROVIDER_API CALL
Yeyo Lundberg)  ColonRectal Surgery; Surgery  [facility], 2nd Floor  [place], [place] [address]  Phone: [phone]  Fax: [phone]  Follow Up Time:

## 2021-07-02 NOTE — PROGRESS NOTE ADULT - SUBJECTIVE AND OBJECTIVE BOX
INTERVAL HPI/OVERNIGHT EVENTS:  hortencia LFD, -n/-v, +bm x3/+f, OOBA, VSS     STATUS POST:    6/30: robot assisted sigmoidectomy  POST OPERATIVE DAY #: 2    SUBJECTIVE: Pt seen and examined at bedside this am by surgery team. Tolerating low residue diet, pain well controlled. Denies f/n/v/cp/sob.    MEDICATIONS  (STANDING):  acetaminophen   Tablet .. 1000 milliGRAM(s) Oral every 6 hours  heparin   Injectable 5000 Unit(s) SubCutaneous every 8 hours  ketorolac   Injectable 15 milliGRAM(s) IV Push every 6 hours    MEDICATIONS  (PRN):  ondansetron Injectable 4 milliGRAM(s) IV Push every 6 hours PRN Nausea and/or Vomiting      Vital Signs Last 24 Hrs  T(C): 36.9 (02 Jul 2021 13:20), Max: 37.3 (01 Jul 2021 17:15)  T(F): 98.5 (02 Jul 2021 13:20), Max: 99.1 (01 Jul 2021 17:15)  HR: 73 (02 Jul 2021 13:20) (73 - 89)  BP: 117/82 (02 Jul 2021 13:20) (111/73 - 118/81)  BP(mean): --  RR: 16 (02 Jul 2021 13:20) (15 - 18)  SpO2: 96% (02 Jul 2021 13:20) (96% - 98%)    PHYSICAL EXAM:      Gen: NAD, resting comfortably in bed, A&O x3  C/V: NSR  Pulm: Nonlabored breathing, no respiratory distress  Abd: abdomen soft, nd, appropriately ttp to incisions. Dressings c/d/i.  Extrem: WWP, no calf tenderness or edema, SCDs in place        I&O's Detail    01 Jul 2021 07:01  -  02 Jul 2021 07:00  --------------------------------------------------------  IN:    Lactated Ringers: 40 mL    Oral Fluid: 1170 mL  Total IN: 1210 mL    OUT:    Voided (mL): 2351 mL  Total OUT: 2351 mL    Total NET: -1141 mL      02 Jul 2021 07:01  -  02 Jul 2021 13:46  --------------------------------------------------------  IN:    Oral Fluid: 760 mL  Total IN: 760 mL    OUT:    Voided (mL): 900 mL  Total OUT: 900 mL    Total NET: -140 mL          LABS:                        10.8   4.70  )-----------( 281      ( 02 Jul 2021 06:25 )             33.6     07-02    138  |  108  |  4<L>  ----------------------------<  87  4.0   |  20<L>  |  0.51    Ca    8.1<L>      02 Jul 2021 06:25  Phos  3.1     07-02  Mg     1.8     07-02            RADIOLOGY & ADDITIONAL STUDIES:

## 2021-07-02 NOTE — DISCHARGE NOTE PROVIDER - NSDCCPTREATMENT_GEN_ALL_CORE_FT
PRINCIPAL PROCEDURE  Procedure: Colectomy, sigmoid, robot-assisted, laparoscopic  Findings and Treatment:

## 2021-07-02 NOTE — PROGRESS NOTE ADULT - ASSESSMENT
46 year old female with PMH of newly diagnosed colon cancer presents for sigmoid colectomy. Patient initially presented with "sensitive stomach" and BRBPR for several years, becoming more frequent in the last year. Underwent colonoscopy on 6/15/21 which showed a frond-like/villous, fungating, infiltrative and ulcerated large mass in the sigmoid colon, partially circumferential measuring 5cm in length. Pathology consistent with moderately invasive adenocarcinoma. Pt is now s/p robot assisted sigmoidectomy on 6/30.     Pain/nausea control prn  Low residue diet/HLIB  HSQ/SCDs   OOBA/IS  AM labs   Likely discharge today

## 2021-07-02 NOTE — DISCHARGE NOTE PROVIDER - NSDCFUADDINST_GEN_ALL_CORE_FT
Warm/Dry
Follow up with Dr. Lundberg in 1 week. Call the office at [phone] to schedule your appointment. You may shower; soap and water over incision sites. Do not scrub. Pat dry when done. No tub bathing or swimming until cleared. Keep incision sites out of the sun as scars will darken. Ambulate as tolerated, but no heavy lifting (>10lbs) or strenuous exercise. You should be urinating at least 3-4x per day. Call the office if you experience increasing abdominal pain, nausea, vomiting, or temperature >101 F.    Take percocet and colace as directed.

## 2021-07-09 PROBLEM — C18.9 MALIGNANT NEOPLASM OF COLON, UNSPECIFIED: Chronic | Status: ACTIVE | Noted: 2021-06-29

## 2021-07-12 LAB — SURGICAL PATHOLOGY STUDY: SIGNIFICANT CHANGE UP

## 2021-07-13 DIAGNOSIS — K64.8 OTHER HEMORRHOIDS: ICD-10-CM

## 2021-07-13 DIAGNOSIS — D25.9 LEIOMYOMA OF UTERUS, UNSPECIFIED: ICD-10-CM

## 2021-07-13 DIAGNOSIS — C18.7 MALIGNANT NEOPLASM OF SIGMOID COLON: ICD-10-CM

## 2021-07-13 DIAGNOSIS — Z83.79 FAMILY HISTORY OF OTHER DISEASES OF THE DIGESTIVE SYSTEM: ICD-10-CM

## 2021-07-13 DIAGNOSIS — Z98.890 OTHER SPECIFIED POSTPROCEDURAL STATES: ICD-10-CM

## 2021-07-16 ENCOUNTER — APPOINTMENT (OUTPATIENT)
Dept: COLORECTAL SURGERY | Facility: CLINIC | Age: 46
End: 2021-07-16
Payer: MEDICAID

## 2021-07-16 VITALS
HEIGHT: 63 IN | HEART RATE: 108 BPM | BODY MASS INDEX: 19.49 KG/M2 | DIASTOLIC BLOOD PRESSURE: 90 MMHG | WEIGHT: 110 LBS | TEMPERATURE: 97.2 F | SYSTOLIC BLOOD PRESSURE: 129 MMHG

## 2021-07-16 PROCEDURE — 99024 POSTOP FOLLOW-UP VISIT: CPT

## 2021-07-16 RX ORDER — NEOMYCIN SULFATE 500 MG/1
500 TABLET ORAL
Qty: 6 | Refills: 0 | Status: DISCONTINUED | COMMUNITY
Start: 2021-06-25 | End: 2021-07-16

## 2021-07-16 RX ORDER — METRONIDAZOLE 500 MG/1
500 TABLET ORAL
Qty: 6 | Refills: 0 | Status: DISCONTINUED | COMMUNITY
Start: 2021-06-25 | End: 2021-07-16

## 2021-07-16 NOTE — ASSESSMENT
[FreeTextEntry1] : Stage I colon cancer.\par \par Recovered well from surgery.\par \par Advised surveillance. Return to primary GI in one year for colonoscopy.\par \par Followup one year with our office for repeat exam and CT scan.

## 2021-07-16 NOTE — PHYSICAL EXAM
[Abdomen Masses] : No abdominal masses [Abdomen Tenderness] : ~T No ~M abdominal tenderness [No HSM] : no hepatosplenomegaly [JVD] : no jugular venous distention  [Normal Breath Sounds] : Normal breath sounds [Normal Heart Sounds] : normal heart sounds [Alert] : alert [de-identified] : Abdomen is soft, nontender, nondistended. Incisions are well healed. No hernia or masses\par

## 2021-07-16 NOTE — HISTORY OF PRESENT ILLNESS
[FreeTextEntry1] : 47 y/o F presents for f/u T2N0 colon cancer\par \par S/p robotic sigmoid colectomy 6/30/21\par \par  Final Diagnosis\par 1. Sigmoid rectum, resection:\par - Invasive adenocarcinoma, moderately differentiated, 3 cm,\par arising in a\par tubulovillous adenoma.\par - The carcinoma invades muscularis propria.\par - Lymphovascular invasion is not seen.\par - Proximal, distal and mesenteric margins are negative for\par carcinoma.\par - Forty five lymph nodes, negative for carcinoma (0/45).\par \par 2. Proximal donut, resection:\par - Portion of colon, negative for carcinoma.\par \par 3. Distal donut, resection:\par - Portion of colon, negative for carcinoma.\par \par pT2 N0\par \par Verified by: Aileen Hernández MD\par \par Comment\par MOL: 1E, 1F, 1H, 1D\par \par Synoptic Summary\par Procedure\par ___ Sigmoidectomy\par ___ Rectum resection\par Macroscopic Evaluation of Mesorectum\par ___ Incomplete\par Tumor Site\par ___ Sigmoid colon\par Histologic Type\par ___ Adenocarcinoma\par Histologic Grade\par ___ G2, moderately differentiated\par Tumor Size\par ___ Greatest dimension in Centimeters (cm): 3 cm\par +Additional Dimension in Centimeters (cm):2.7 x 1.5 cm\par Tumor Extent\par ___ Invades into muscularis propria\par Macroscopic Tumor Perforation\par ___ Not identified\par Lymphovascular Invasion\par ___ Not identified\par Perineural Invasion\par ___ Not identified\par +Tumor Bud Score\par ___ Low (0-4)\par Type of Polyp in which Invasive Carcinoma Arose\par ___ Tubulovillous adenoma\par Treatment Effect\par ___ No known presurgical therapy\par Margin Status for Invasive Carcinoma\par ___ All margins negative for invasive carcinoma\par ___  Margins examined: Proximal, distal, mesenteric\par Distance from Invasive Carcinoma to Closest Margin\par ___ Radial (circumferential):1.5mm\par Regional Lymph Node Status\par ___ All regional lymph nodes negative for tumor\par ___ Number of Lymph Nodes Examined: 45\par Tumor Deposits\par ___ Not identified\par Distant Site(s) Involved\par N/A\par PATHOLOGIC STAGE CLASSIFICATION (pTNM, AJCC 8th Edition)\par ___ pT2: Tumor invades the muscularis propria\par ___ pN0: No regional lymph node metastasis\par \par Reports occasional incisional pain, slowly improving. Initially used Percocet following surgery but no longer using pain medicines \par Incisions healing well\par Denies N/V. Reports appetite is adequate, mostly snacking throughout the day. Has started working dietary fiber back in to diet without issue\par Reports energy levels are back to normal, has been walking 10,000 steps daily \par BH:5-6 times daily, soft and formed\par Initially used Colace when using pain medication, no longer using

## 2021-08-02 ENCOUNTER — NON-APPOINTMENT (OUTPATIENT)
Age: 46
End: 2021-08-02

## 2022-06-27 ENCOUNTER — APPOINTMENT (OUTPATIENT)
Dept: COLORECTAL SURGERY | Facility: CLINIC | Age: 47
End: 2022-06-27

## 2022-06-27 ENCOUNTER — NON-APPOINTMENT (OUTPATIENT)
Age: 47
End: 2022-06-27

## 2022-06-27 VITALS
HEIGHT: 63 IN | BODY MASS INDEX: 20.91 KG/M2 | HEART RATE: 79 BPM | SYSTOLIC BLOOD PRESSURE: 124 MMHG | WEIGHT: 118 LBS | DIASTOLIC BLOOD PRESSURE: 85 MMHG | TEMPERATURE: 98 F

## 2022-06-27 PROCEDURE — 99214 OFFICE O/P EST MOD 30 MIN: CPT

## 2022-06-27 NOTE — PHYSICAL EXAM
[Abdomen Masses] : No abdominal masses [Abdomen Tenderness] : ~T No ~M abdominal tenderness [No HSM] : no hepatosplenomegaly [JVD] : no jugular venous distention  [Normal Breath Sounds] : Normal breath sounds [Normal Heart Sounds] : normal heart sounds [Alert] : alert [de-identified] : Abdomen is soft, nontender, nondistended. Incisions are well healed. No hernia or masses\par

## 2022-06-27 NOTE — ASSESSMENT
[FreeTextEntry1] : Overall 1 year status post sigmoid colectomy for colon cancer–stage I.\par \par Advised routine surveillance imaging and labs.\par \par Follow-up colonoscopy with primary GI.\par \par Follow-up 1 year

## 2022-06-27 NOTE — HISTORY OF PRESENT ILLNESS
[FreeTextEntry1] : 48 y/o F presents for evaluation of T2N0 Colon Cancer \par Known to GI Cameron Booker\par \par s/p Robotic sigmoid colectomy 6/30/21, outside slide review showed MMR intact\par \par Last seen in the office 7/16/21 for post op follow up: Abdomen soft, non-tender, incisions well healed, no hernia or masses appreciated. \par Pt return to primary GI for one year colonoscopy. F/u yearly for repeat exam and CT scan.  \par \par Overall patient doing well.  Occasional incisional discomfort at the lower abdominal extraction site with yoga exercises.\par \par Mildly occasional constipation.\par Excellent appetite energy.\par  surveillance colonoscopy scheduled in 1 week's time.\par \par \par

## 2022-06-29 ENCOUNTER — RESULT REVIEW (OUTPATIENT)
Age: 47
End: 2022-06-29

## 2022-06-30 LAB
ALBUMIN SERPL ELPH-MCNC: 4.5 G/DL
ALP BLD-CCNC: 54 U/L
ALT SERPL-CCNC: 9 U/L
ANION GAP SERPL CALC-SCNC: 12 MMOL/L
AST SERPL-CCNC: 17 U/L
BASOPHILS # BLD AUTO: 0.06 K/UL
BASOPHILS NFR BLD AUTO: 1.2 %
BILIRUB SERPL-MCNC: 0.6 MG/DL
BUN SERPL-MCNC: 10 MG/DL
CALCIUM SERPL-MCNC: 9.8 MG/DL
CEA SERPL-MCNC: 1.1 NG/ML
CHLORIDE SERPL-SCNC: 105 MMOL/L
CO2 SERPL-SCNC: 21 MMOL/L
CREAT SERPL-MCNC: 0.58 MG/DL
EGFR: 111 ML/MIN/1.73M2
EOSINOPHIL # BLD AUTO: 0.04 K/UL
EOSINOPHIL NFR BLD AUTO: 0.8 %
GLUCOSE SERPL-MCNC: 92 MG/DL
HCT VFR BLD CALC: 37.9 %
HGB BLD-MCNC: 13 G/DL
IMM GRANULOCYTES NFR BLD AUTO: 0 %
LYMPHOCYTES # BLD AUTO: 1.99 K/UL
LYMPHOCYTES NFR BLD AUTO: 40.3 %
MAN DIFF?: NORMAL
MCHC RBC-ENTMCNC: 30.2 PG
MCHC RBC-ENTMCNC: 34.3 GM/DL
MCV RBC AUTO: 88.1 FL
MONOCYTES # BLD AUTO: 0.39 K/UL
MONOCYTES NFR BLD AUTO: 7.9 %
NEUTROPHILS # BLD AUTO: 2.46 K/UL
NEUTROPHILS NFR BLD AUTO: 49.8 %
PLATELET # BLD AUTO: 334 K/UL
POTASSIUM SERPL-SCNC: 4.3 MMOL/L
PROT SERPL-MCNC: 7 G/DL
RBC # BLD: 4.3 M/UL
RBC # FLD: 13.8 %
SODIUM SERPL-SCNC: 137 MMOL/L
WBC # FLD AUTO: 4.94 K/UL

## 2022-07-01 ENCOUNTER — APPOINTMENT (OUTPATIENT)
Dept: CT IMAGING | Facility: CLINIC | Age: 47
End: 2022-07-01
Payer: MEDICAID

## 2022-07-01 ENCOUNTER — OUTPATIENT (OUTPATIENT)
Dept: OUTPATIENT SERVICES | Facility: HOSPITAL | Age: 47
LOS: 1 days | End: 2022-07-01

## 2022-07-01 DIAGNOSIS — Z98.890 OTHER SPECIFIED POSTPROCEDURAL STATES: Chronic | ICD-10-CM

## 2022-07-01 PROCEDURE — 71260 CT THORAX DX C+: CPT | Mod: 26

## 2022-07-01 PROCEDURE — 74177 CT ABD & PELVIS W/CONTRAST: CPT | Mod: 26

## 2023-02-07 ENCOUNTER — APPOINTMENT (OUTPATIENT)
Dept: CT IMAGING | Facility: CLINIC | Age: 48
End: 2023-02-07
Payer: MEDICAID

## 2023-02-07 ENCOUNTER — OUTPATIENT (OUTPATIENT)
Dept: OUTPATIENT SERVICES | Facility: HOSPITAL | Age: 48
LOS: 1 days | End: 2023-02-07

## 2023-02-07 DIAGNOSIS — Z98.890 OTHER SPECIFIED POSTPROCEDURAL STATES: Chronic | ICD-10-CM

## 2023-02-07 PROCEDURE — 71250 CT THORAX DX C-: CPT | Mod: 26

## 2023-02-17 ENCOUNTER — NON-APPOINTMENT (OUTPATIENT)
Age: 48
End: 2023-02-17

## 2023-03-22 ENCOUNTER — APPOINTMENT (OUTPATIENT)
Dept: PULMONOLOGY | Facility: CLINIC | Age: 48
End: 2023-03-22
Payer: MEDICAID

## 2023-03-22 VITALS
BODY MASS INDEX: 20.38 KG/M2 | SYSTOLIC BLOOD PRESSURE: 118 MMHG | HEART RATE: 93 BPM | DIASTOLIC BLOOD PRESSURE: 80 MMHG | HEIGHT: 63 IN | TEMPERATURE: 97.3 F | OXYGEN SATURATION: 96 % | WEIGHT: 115 LBS

## 2023-03-22 DIAGNOSIS — R91.8 OTHER NONSPECIFIC ABNORMAL FINDING OF LUNG FIELD: ICD-10-CM

## 2023-03-22 DIAGNOSIS — Z87.891 PERSONAL HISTORY OF NICOTINE DEPENDENCE: ICD-10-CM

## 2023-03-22 PROCEDURE — 99204 OFFICE O/P NEW MOD 45 MIN: CPT

## 2023-03-22 NOTE — HISTORY OF PRESENT ILLNESS
[Former] : former [Never] : never [ESS] : 0 [TextBox_4] : 48 year old female with history of colorectal cancer stage I s/p sigmoid colectomy resection on 6/2021. Found to have pulmonary nodules incidentally. CT chest 2/7/23 showed moderate both small and large airway inflammation.\par \par \par \par

## 2023-03-22 NOTE — ASSESSMENT
[FreeTextEntry1] : I discussed the case in details with the patient.  I was unable to review images on the left side.  I reviewed the CT scan reports.  There is stability and abnormality since 2021 that confirms more than 2-year stability of the nodules.  The nodules in the form of tree-in-bud which is consistent with small airway disease.  There is also some cylindrical bronchiectasis with mucus impaction.\par \par I educated the patient on the small and large airway disease.  The patient at this time only has a cough which is most likely related to the post nasal drip and she was prescribed Flonase by her primary physician.\par \par There is no clinical no radiological evidence of metastatic disease in the lung.\par \par At this point is no indication for treatment as the condition could be related to either airway inflammation or atypical mycobacterial infection.  Patient has a strong cough and able to clear his secretion.  No indication for bronchodilators.  Patient is to repeat the CT scan in 1 year.\par \par \par Informed the patient to contact me if there is has been any change in her condition.

## 2023-03-22 NOTE — HISTORY OF PRESENT ILLNESS
[Former] : former [Never] : never [ESS] : 0 [TextBox_4] : 48 year old female with history of colorectal cancer stage I s/p sigmoid colectomy resection on 6/2021. Found to have pulmonary nodules incidentally. CT chest 2/7/23 showed moderate both small and large airway inflammation.\par \par \par \par  [Initial Consultation] : an initial consultation for [Syncope] : syncope [Family Member] : family member

## 2023-03-22 NOTE — REVIEW OF SYSTEMS
[Cough] : cough [Negative] : Endocrine [Hemoptysis] : no hemoptysis [Chest Tightness] : no chest tightness [Frequent URIs] : no frequent URIs [Sputum] : no sputum [Dyspnea] : no dyspnea [Pleuritic Pain] : no pleuritic pain [Wheezing] : no wheezing [A.M. Dry Mouth] : no a.m. dry mouth [SOB on Exertion] : no sob on exertion

## 2023-04-25 ENCOUNTER — APPOINTMENT (OUTPATIENT)
Dept: PULMONOLOGY | Facility: CLINIC | Age: 48
End: 2023-04-25

## 2023-07-24 ENCOUNTER — NON-APPOINTMENT (OUTPATIENT)
Age: 48
End: 2023-07-24

## 2023-07-24 ENCOUNTER — APPOINTMENT (OUTPATIENT)
Dept: COLORECTAL SURGERY | Facility: CLINIC | Age: 48
End: 2023-07-24
Payer: MEDICAID

## 2023-07-24 VITALS
DIASTOLIC BLOOD PRESSURE: 85 MMHG | HEIGHT: 63 IN | WEIGHT: 120 LBS | TEMPERATURE: 98 F | SYSTOLIC BLOOD PRESSURE: 123 MMHG | HEART RATE: 81 BPM | BODY MASS INDEX: 21.26 KG/M2

## 2023-07-24 DIAGNOSIS — C18.9 MALIGNANT NEOPLASM OF COLON, UNSPECIFIED: ICD-10-CM

## 2023-07-24 PROCEDURE — 99213 OFFICE O/P EST LOW 20 MIN: CPT

## 2023-07-24 NOTE — ASSESSMENT
[FreeTextEntry1] : Stage I colon cancer.  No adverse risk factors.\par \par Recommend return to GI in 1 year for surveillance colonoscopy.\par \par Patient advised by pulmonary medicine to repeat CT of the chest for follow-up in March 2024.\par \par \par Follow-up 1 year.

## 2023-07-24 NOTE — PHYSICAL EXAM
[Abdomen Masses] : No abdominal masses [Abdomen Tenderness] : ~T No ~M abdominal tenderness [No HSM] : no hepatosplenomegaly [JVD] : no jugular venous distention  [Normal Breath Sounds] : Normal breath sounds [Normal Heart Sounds] : normal heart sounds [Alert] : alert [de-identified] : Abdomen is soft, nontender, nondistended. Incisions are well healed. No hernia or masses\par

## 2023-07-24 NOTE — HISTORY OF PRESENT ILLNESS
[FreeTextEntry1] : 47 y/o F presents for follow up evaluation of pT2N0 colon CA\par \par s/p Robotic sigmoid colectomy 6/30/21, outside slide review showed MMR intact\par \par Seen in the office on 7/16/21 for post op follow up: Abdomen soft, non-tender, incisions well healed, no hernia or masses appreciated. \par \par Gi: Dr. Cameron Booker \par \par Most recently seen on 6/27/22, On exam, incisions well healed, no hernia or masses. Overall 1 year s/p sigmoid colectomy for stage I colon CA. CEA drawn 1.1\par \par Pt advised for routine surveillance. F/u colonoscopy with primary GI advised. \par \par CT c/a/p completed 7/1/2022\par IMPRESSION:\par \par 1. Overall unchanged right and increased left lung small airway disease/bronchiolitis, infectious/inflammatory in etiology.\par 2. New subcentimeter nodule in the right upper lobe, probably also infectious/inflammatory. Reassessed on follow-up.\par 3. No metastasis in  abdomen and pelvis.\par \par CT chest performed [facility] 2/7/23 for surveillance of RUL nodule on prior CT:\par \par Impression: \par Moderate small and large airways inflammation with a slight overall improvement in extent of disease. Abbreviated differential includes chronic infectious and/or inflammatory etiologies and in the appropriate clinical setting pulmonary nontuberculous mycobacterial infection cannot be excluded. Bronchoscopic correlation and periodic interval surveillance may be of value. No hematogenous metastatic pattern is demonstrated.\par \par Pt w/ persistent cough since COVID (8/22), referred to PULM Dr. Fischer for further evaluation\par Seen by PULM 3/22/2023, stable nodules, small airway disease and post nasal drip. Recommend to repeat CT chest in one year (due March 2024)\par \par Reports cough has since resolved, she continues flonase\par Completed colonoscopy last year w/ Dr. Booker (normal per pt and advised to f/u in another year)\par \par Completed labs w/ PCP in October 2022 including CEA which was otherwise normal, due for labs today and pt prefers to complete w/ this office instead.\par Reports come intermittent numbness/discomfort at larger incision site w/ certain yoga moves\par Moving bowels regularly, but admits to some "stomach upset" followed by a few movements and diarrhea, re-assured by GI likely IBS.\par Follows regular diet\par

## 2023-08-09 LAB
ALBUMIN SERPL ELPH-MCNC: 4.4 G/DL
ALP BLD-CCNC: 61 U/L
ALT SERPL-CCNC: 13 U/L
ANION GAP SERPL CALC-SCNC: 13 MMOL/L
AST SERPL-CCNC: 20 U/L
BASOPHILS # BLD AUTO: 0.05 K/UL
BASOPHILS NFR BLD AUTO: 0.9 %
BILIRUB SERPL-MCNC: 0.3 MG/DL
BUN SERPL-MCNC: 10 MG/DL
CALCIUM SERPL-MCNC: 10 MG/DL
CEA SERPL-MCNC: 1.1 NG/ML
CHLORIDE SERPL-SCNC: 102 MMOL/L
CO2 SERPL-SCNC: 22 MMOL/L
CREAT SERPL-MCNC: 0.62 MG/DL
EGFR: 108 ML/MIN/1.73M2
EOSINOPHIL # BLD AUTO: 0.15 K/UL
EOSINOPHIL NFR BLD AUTO: 2.7 %
GLUCOSE SERPL-MCNC: 91 MG/DL
HCT VFR BLD CALC: 40.3 %
HGB BLD-MCNC: 13.1 G/DL
IMM GRANULOCYTES NFR BLD AUTO: 0.2 %
LYMPHOCYTES # BLD AUTO: 2.18 K/UL
LYMPHOCYTES NFR BLD AUTO: 39 %
MAN DIFF?: NORMAL
MCHC RBC-ENTMCNC: 30.2 PG
MCHC RBC-ENTMCNC: 32.5 GM/DL
MCV RBC AUTO: 92.9 FL
MONOCYTES # BLD AUTO: 0.48 K/UL
MONOCYTES NFR BLD AUTO: 8.6 %
NEUTROPHILS # BLD AUTO: 2.72 K/UL
NEUTROPHILS NFR BLD AUTO: 48.6 %
PLATELET # BLD AUTO: 339 K/UL
POTASSIUM SERPL-SCNC: 4.4 MMOL/L
PROT SERPL-MCNC: 7.2 G/DL
RBC # BLD: 4.34 M/UL
RBC # FLD: 13.2 %
SODIUM SERPL-SCNC: 137 MMOL/L
WBC # FLD AUTO: 5.59 K/UL

## 2023-11-21 NOTE — BRIEF OPERATIVE NOTE - NSICDXBRIEFPREOP_GEN_ALL_CORE_FT
Called patient to cancel nursing visit today.  Patient is agreeable to next visit on 11/24/23 PRE-OP DIAGNOSIS:  Colon cancer 30-Jun-2021 11:38:03  Mabel Rodriguez

## 2024-07-22 ENCOUNTER — APPOINTMENT (OUTPATIENT)
Dept: COLORECTAL SURGERY | Facility: CLINIC | Age: 49
End: 2024-07-22
Payer: COMMERCIAL

## 2024-07-22 VITALS
TEMPERATURE: 98.2 F | SYSTOLIC BLOOD PRESSURE: 131 MMHG | HEART RATE: 88 BPM | DIASTOLIC BLOOD PRESSURE: 76 MMHG | BODY MASS INDEX: 21.85 KG/M2 | WEIGHT: 123.31 LBS | HEIGHT: 63 IN

## 2024-07-22 DIAGNOSIS — C18.9 MALIGNANT NEOPLASM OF COLON, UNSPECIFIED: ICD-10-CM

## 2024-07-22 PROCEDURE — 99213 OFFICE O/P EST LOW 20 MIN: CPT

## 2024-07-22 NOTE — ASSESSMENT
[FreeTextEntry1] : History of stage I colon cancer.  Recommend return to GI for surveillance colonoscopy as indicated.  Follow-up with pulmonary for lung nodules as recommended.

## 2024-07-22 NOTE — HISTORY OF PRESENT ILLNESS
[FreeTextEntry1] : 50 y/o F presents for follow up, hx of pT2N0 colon CA s/p Robotic sigmoid colectomy 6/30/21, outside slide review showed MMR intact  Seen in the office on 7/16/21 for post op follow up: Abdomen soft, non-tender, incisions well healed, no hernia or masses appreciated.  Seen on 6/27/22, On exam, incisions well healed, no hernia or masses. Advised routine follow up with GI for colonoscopy  CT c/a/p completed 7/1/2022 IMPRESSION: 1. Overall unchanged right and increased left lung small airway disease/bronchiolitis, infectious/inflammatory in etiology.  2. New subcentimeter nodule in the right upper lobe, probably also infectious/inflammatory. Reassessed on follow-up.  3. No metastasis in abdomen and pelvis.    CT chest performed [facility] 2/7/23 for surveillance of RUL nodule on prior CT: Impression: -Moderate small and large airways inflammation with a slight overall improvement in extent of disease. Abbreviated differential includes chronic infectious and/or inflammatory etiologies and in the appropriate clinical setting pulmonary nontuberculous mycobacterial infection cannot be excluded. Bronchoscopic correlation and periodic interval surveillance may be of value. No hematogenous metastatic pattern is demonstrated.  Seen by PULM 3/22/2023, stable nodules, small airway disease and post nasal drip. Recommend to repeat CT chest in one year (due March 2024)  Completed colonoscopy 07/2022 w/ Dr. Booker Impression:  -Terminal ileum normal. bx taken w/ cold forceps -4mm sessile polyp found in the descending colon, s/p resection and retrieval -2mm sessile polyp found in the rectum, s/p resection and retrieval -Patent end to end colo-colonic anastamosis, healthy appreaing mucosa and visible sutures. Biopsied.   -Non-bleeding internal hemorrhoids  Pathology: Ileal Mucosa WNL normal limits Descending colon: Colonic mucosa WNL Rectosigmoid: consistent with hyperplastic polyp.  Rectum: Colonic mucosa with hyperplastic polyp  Last seen 07/24/23 for follow up, labs drawn at visit, recommended to return to GI for surveillance colonoscopy.  CEA 1.1, CMP WNL, CBC WNL  Patient presents for follow up. Overall doing well. Has not had colonoscopy yet due to family emergency. Patient reports moving bowels daily, no rectal bleeding, reports some episodes of loose stool with certain foods.   Previously followed by Pulm for pulm nodules, states provider she saw last moved to [place] has not followed up or done repeat imaging.

## 2024-07-22 NOTE — PHYSICAL EXAM
[Abdomen Masses] : No abdominal masses [Abdomen Tenderness] : ~T No ~M abdominal tenderness [No HSM] : no hepatosplenomegaly [JVD] : no jugular venous distention  [Normal Breath Sounds] : Normal breath sounds [Normal Heart Sounds] : normal heart sounds [Alert] : alert [de-identified] : Abdomen is soft, nontender, nondistended. Incisions are well healed. No hernia or masses\par